# Patient Record
Sex: MALE | Race: BLACK OR AFRICAN AMERICAN | NOT HISPANIC OR LATINO | Employment: FULL TIME | URBAN - METROPOLITAN AREA
[De-identification: names, ages, dates, MRNs, and addresses within clinical notes are randomized per-mention and may not be internally consistent; named-entity substitution may affect disease eponyms.]

---

## 2023-07-13 ENCOUNTER — OFFICE VISIT (OUTPATIENT)
Dept: URGENT CARE | Facility: CLINIC | Age: 38
End: 2023-07-13
Payer: OTHER MISCELLANEOUS

## 2023-07-13 VITALS
HEIGHT: 69 IN | SYSTOLIC BLOOD PRESSURE: 126 MMHG | DIASTOLIC BLOOD PRESSURE: 81 MMHG | RESPIRATION RATE: 16 BRPM | BODY MASS INDEX: 25.77 KG/M2 | TEMPERATURE: 98 F | HEART RATE: 54 BPM | WEIGHT: 174 LBS | OXYGEN SATURATION: 97 %

## 2023-07-13 DIAGNOSIS — Z02.6 ENCOUNTER RELATED TO WORKER'S COMPENSATION CLAIM: ICD-10-CM

## 2023-07-13 DIAGNOSIS — R55 SYNCOPE AND COLLAPSE: Primary | ICD-10-CM

## 2023-07-13 PROCEDURE — 99203 PR OFFICE/OUTPT VISIT, NEW, LEVL III, 30-44 MIN: ICD-10-PCS | Mod: S$GLB,,, | Performed by: FAMILY MEDICINE

## 2023-07-13 PROCEDURE — 99203 OFFICE O/P NEW LOW 30 MIN: CPT | Mod: S$GLB,,, | Performed by: FAMILY MEDICINE

## 2023-07-13 RX ORDER — OXCARBAZEPINE 300 MG/1
300 TABLET, FILM COATED ORAL 2 TIMES DAILY
COMMUNITY
Start: 2023-06-09

## 2023-07-13 NOTE — PROGRESS NOTES
Subjective:      Patient ID: Harlan Degroot is a 38 y.o. male.    Chief Complaint: Loss of Consciousness    38 y.o. male presents after LOC/fainting. He has head pressure and pain. He states he has been having pressure by his ear on the right side of his face for the past 2 days. He was at work when he felt lightheaded and passed out. He states he was still able to hear but he can't remember how long he was down for. He states his helper had to lay him on the ground. He was hazy and his eyes were blurry. He states last week he was seeing black dots and thinks it was the heat getting the best of him. Until about 30 mins ago the pressure finally went away but he is concerned about it. He states he would have to put pressure on both ears to help with the pain.  His pulse is fluctuating at rest between 50-60 bpm but when he talks his pulse goes up. He has minimal pain in his head. He took 2 Excedrin around 8:30 am and thinks it is masking his pain. He states he does not feel like himself and does not have the same energy he normally has.    Loss of Consciousness  This is a new problem. The current episode started today. The problem occurs constantly. The problem has been unchanged. Length of episode of loss of consciousness: unsure of time frame but pt was on the floor for about 18 mins. The symptoms are aggravated by exertion. Associated symptoms include malaise/fatigue.   Cardiovascular:  Positive for passing out.   Objective:     Physical Exam  Constitutional:       General: He is not in acute distress.     Appearance: Normal appearance. He is not ill-appearing.   HENT:      Head: Normocephalic and atraumatic.   Eyes:      Extraocular Movements: Extraocular movements intact.      Pupils: Pupils are equal, round, and reactive to light.   Cardiovascular:      Rate and Rhythm: Bradycardia present.   Pulmonary:      Effort: Pulmonary effort is normal.   Abdominal:      Palpations: There is no mass.      Tenderness:  There is no abdominal tenderness.   Musculoskeletal:         General: No swelling or tenderness.      Cervical back: Normal range of motion. No rigidity.   Skin:     General: Skin is dry.   Neurological:      General: No focal deficit present.      Mental Status: He is alert. Mental status is at baseline. He is disoriented.   Psychiatric:         Behavior: Behavior normal.         Thought Content: Thought content normal.         Judgment: Judgment normal.      Assessment:      1. Heat syncope, initial encounter    2. Encounter related to worker's compensation claim      Plan:     Referrral to [facility] for return work. Pt given precautions about making sure that he is drinking enough water and making sure that he eats so that this does not happen.             No follow-ups on file.

## 2023-07-13 NOTE — LETTER
[facility]  [address]  [place] [address]  Phone: [phone]  Fax: [phone]  [facility]: [phone]    Pt Name: Harlan Degroot  Injury Date: 07/13/2023   Employee ID:  Date of First Treatment: 07/13/2023   Company: [employer] to record [employer]      Appointment Time: 10:25 AM Arrived: 10:45   Provider: Alex Obregon MD Time Out:1:01     Office Treatment:   1. Heat syncope, initial encounter    2. Encounter related to worker's compensation claim                     Return Appointment: V

## 2023-08-02 ENCOUNTER — TELEPHONE (OUTPATIENT)
Dept: URGENT CARE | Facility: CLINIC | Age: 38
End: 2023-08-02
Payer: COMMERCIAL

## 2023-08-02 NOTE — TELEPHONE ENCOUNTER
Called patient to get him scheduled with [facility]. Left message and call back number.

## 2023-09-22 ENCOUNTER — LAB VISIT (OUTPATIENT)
Dept: LAB | Facility: HOSPITAL | Age: 38
End: 2023-09-22
Attending: INTERNAL MEDICINE
Payer: COMMERCIAL

## 2023-09-22 ENCOUNTER — OFFICE VISIT (OUTPATIENT)
Dept: PRIMARY CARE CLINIC | Facility: CLINIC | Age: 38
End: 2023-09-22
Payer: COMMERCIAL

## 2023-09-22 VITALS
RESPIRATION RATE: 18 BRPM | DIASTOLIC BLOOD PRESSURE: 78 MMHG | SYSTOLIC BLOOD PRESSURE: 128 MMHG | WEIGHT: 176.56 LBS | OXYGEN SATURATION: 97 % | BODY MASS INDEX: 26.15 KG/M2 | HEART RATE: 76 BPM | HEIGHT: 69 IN | TEMPERATURE: 97 F

## 2023-09-22 DIAGNOSIS — M77.8 ELBOW TENDONITIS: ICD-10-CM

## 2023-09-22 DIAGNOSIS — Z11.59 ENCOUNTER FOR HEPATITIS C SCREENING TEST FOR LOW RISK PATIENT: ICD-10-CM

## 2023-09-22 DIAGNOSIS — Z00.00 ANNUAL PHYSICAL EXAM: ICD-10-CM

## 2023-09-22 DIAGNOSIS — Z11.4 ENCOUNTER FOR SCREENING FOR HIV: ICD-10-CM

## 2023-09-22 DIAGNOSIS — Z00.00 ANNUAL PHYSICAL EXAM: Primary | ICD-10-CM

## 2023-09-22 DIAGNOSIS — F41.9 ANXIETY: ICD-10-CM

## 2023-09-22 LAB
ALBUMIN SERPL BCP-MCNC: 4.2 G/DL (ref 3.5–5.2)
ALP SERPL-CCNC: 79 U/L (ref 55–135)
ALT SERPL W/O P-5'-P-CCNC: 22 U/L (ref 10–44)
ANION GAP SERPL CALC-SCNC: 10 MMOL/L (ref 8–16)
AST SERPL-CCNC: 21 U/L (ref 10–40)
BASOPHILS # BLD AUTO: 0.02 K/UL (ref 0–0.2)
BASOPHILS NFR BLD: 0.4 % (ref 0–1.9)
BILIRUB SERPL-MCNC: 0.3 MG/DL (ref 0.1–1)
BUN SERPL-MCNC: 11 MG/DL (ref 6–20)
CALCIUM SERPL-MCNC: 9.6 MG/DL (ref 8.7–10.5)
CHLORIDE SERPL-SCNC: 107 MMOL/L (ref 95–110)
CHOLEST SERPL-MCNC: 124 MG/DL (ref 120–199)
CHOLEST/HDLC SERPL: 3.1 {RATIO} (ref 2–5)
CO2 SERPL-SCNC: 23 MMOL/L (ref 23–29)
CREAT SERPL-MCNC: 0.9 MG/DL (ref 0.5–1.4)
DIFFERENTIAL METHOD: NORMAL
EOSINOPHIL # BLD AUTO: 0.1 K/UL (ref 0–0.5)
EOSINOPHIL NFR BLD: 2.4 % (ref 0–8)
ERYTHROCYTE [DISTWIDTH] IN BLOOD BY AUTOMATED COUNT: 11.9 % (ref 11.5–14.5)
EST. GFR  (NO RACE VARIABLE): >60 ML/MIN/1.73 M^2
GLUCOSE SERPL-MCNC: 80 MG/DL (ref 70–110)
HCT VFR BLD AUTO: 43.7 % (ref 40–54)
HCV AB SERPL QL IA: NORMAL
HDLC SERPL-MCNC: 40 MG/DL (ref 40–75)
HDLC SERPL: 32.3 % (ref 20–50)
HGB BLD-MCNC: 14.1 G/DL (ref 14–18)
HIV 1+2 AB+HIV1 P24 AG SERPL QL IA: NORMAL
IMM GRANULOCYTES # BLD AUTO: 0.01 K/UL (ref 0–0.04)
IMM GRANULOCYTES NFR BLD AUTO: 0.2 % (ref 0–0.5)
LDLC SERPL CALC-MCNC: 66.6 MG/DL (ref 63–159)
LYMPHOCYTES # BLD AUTO: 1.9 K/UL (ref 1–4.8)
LYMPHOCYTES NFR BLD: 37.6 % (ref 18–48)
MCH RBC QN AUTO: 30 PG (ref 27–31)
MCHC RBC AUTO-ENTMCNC: 32.3 G/DL (ref 32–36)
MCV RBC AUTO: 93 FL (ref 82–98)
MONOCYTES # BLD AUTO: 0.3 K/UL (ref 0.3–1)
MONOCYTES NFR BLD: 6.5 % (ref 4–15)
NEUTROPHILS # BLD AUTO: 2.7 K/UL (ref 1.8–7.7)
NEUTROPHILS NFR BLD: 52.9 % (ref 38–73)
NONHDLC SERPL-MCNC: 84 MG/DL
NRBC BLD-RTO: 0 /100 WBC
PLATELET # BLD AUTO: 275 K/UL (ref 150–450)
PMV BLD AUTO: 10.9 FL (ref 9.2–12.9)
POTASSIUM SERPL-SCNC: 4 MMOL/L (ref 3.5–5.1)
PROT SERPL-MCNC: 7.6 G/DL (ref 6–8.4)
RBC # BLD AUTO: 4.7 M/UL (ref 4.6–6.2)
SODIUM SERPL-SCNC: 140 MMOL/L (ref 136–145)
TRIGL SERPL-MCNC: 87 MG/DL (ref 30–150)
TSH SERPL DL<=0.005 MIU/L-ACNC: 0.63 UIU/ML (ref 0.4–4)
WBC # BLD AUTO: 5.08 K/UL (ref 3.9–12.7)

## 2023-09-22 PROCEDURE — 3008F PR BODY MASS INDEX (BMI) DOCUMENTED: ICD-10-PCS | Mod: CPTII,S$GLB,, | Performed by: INTERNAL MEDICINE

## 2023-09-22 PROCEDURE — 80053 COMPREHEN METABOLIC PANEL: CPT | Performed by: INTERNAL MEDICINE

## 2023-09-22 PROCEDURE — 36415 COLL VENOUS BLD VENIPUNCTURE: CPT | Performed by: INTERNAL MEDICINE

## 2023-09-22 PROCEDURE — 86803 HEPATITIS C AB TEST: CPT | Performed by: INTERNAL MEDICINE

## 2023-09-22 PROCEDURE — 87389 HIV-1 AG W/HIV-1&-2 AB AG IA: CPT | Performed by: INTERNAL MEDICINE

## 2023-09-22 PROCEDURE — 1159F PR MEDICATION LIST DOCUMENTED IN MEDICAL RECORD: ICD-10-PCS | Mod: CPTII,S$GLB,, | Performed by: INTERNAL MEDICINE

## 2023-09-22 PROCEDURE — 3078F PR MOST RECENT DIASTOLIC BLOOD PRESSURE < 80 MM HG: ICD-10-PCS | Mod: CPTII,S$GLB,, | Performed by: INTERNAL MEDICINE

## 2023-09-22 PROCEDURE — 3074F SYST BP LT 130 MM HG: CPT | Mod: CPTII,S$GLB,, | Performed by: INTERNAL MEDICINE

## 2023-09-22 PROCEDURE — 84443 ASSAY THYROID STIM HORMONE: CPT | Performed by: INTERNAL MEDICINE

## 2023-09-22 PROCEDURE — 1160F RVW MEDS BY RX/DR IN RCRD: CPT | Mod: CPTII,S$GLB,, | Performed by: INTERNAL MEDICINE

## 2023-09-22 PROCEDURE — 3008F BODY MASS INDEX DOCD: CPT | Mod: CPTII,S$GLB,, | Performed by: INTERNAL MEDICINE

## 2023-09-22 PROCEDURE — 85025 COMPLETE CBC W/AUTO DIFF WBC: CPT | Performed by: INTERNAL MEDICINE

## 2023-09-22 PROCEDURE — 99999 PR PBB SHADOW E&M-EST. PATIENT-LVL IV: ICD-10-PCS | Mod: PBBFAC,,, | Performed by: INTERNAL MEDICINE

## 2023-09-22 PROCEDURE — 99385 PR PREVENTIVE VISIT,NEW,18-39: ICD-10-PCS | Mod: S$GLB,,, | Performed by: INTERNAL MEDICINE

## 2023-09-22 PROCEDURE — 3078F DIAST BP <80 MM HG: CPT | Mod: CPTII,S$GLB,, | Performed by: INTERNAL MEDICINE

## 2023-09-22 PROCEDURE — 3074F PR MOST RECENT SYSTOLIC BLOOD PRESSURE < 130 MM HG: ICD-10-PCS | Mod: CPTII,S$GLB,, | Performed by: INTERNAL MEDICINE

## 2023-09-22 PROCEDURE — 80061 LIPID PANEL: CPT | Performed by: INTERNAL MEDICINE

## 2023-09-22 PROCEDURE — 99999 PR PBB SHADOW E&M-EST. PATIENT-LVL IV: CPT | Mod: PBBFAC,,, | Performed by: INTERNAL MEDICINE

## 2023-09-22 PROCEDURE — 99385 PREV VISIT NEW AGE 18-39: CPT | Mod: S$GLB,,, | Performed by: INTERNAL MEDICINE

## 2023-09-22 PROCEDURE — 1159F MED LIST DOCD IN RCRD: CPT | Mod: CPTII,S$GLB,, | Performed by: INTERNAL MEDICINE

## 2023-09-22 PROCEDURE — 1160F PR REVIEW ALL MEDS BY PRESCRIBER/CLIN PHARMACIST DOCUMENTED: ICD-10-PCS | Mod: CPTII,S$GLB,, | Performed by: INTERNAL MEDICINE

## 2023-09-22 RX ORDER — DIAZEPAM 2 MG/1
2 TABLET ORAL 2 TIMES DAILY PRN
COMMUNITY
Start: 2023-07-20

## 2023-09-22 NOTE — PROGRESS NOTES
[facility] Note    Chief Complaint      Chief Complaint   Patient presents with    Establish Care    Numbness     Numbness in fingers        History of Present Illness      Harlan Degroot is a 38 y.o. male who presents today for   Chief Complaint   Patient presents with    Establish Care    Numbness     Numbness in fingers    .  Patient comes to appointment here for establish preventative visit with me . He needs full screening labs with this visit as well . He is very active with his job . He is a regular weight  for [email or web address] as well .     He states incidentally that he had episode where he passed out at work . He was outide in extreme heat in warehouse . He went to urgent care felt was heat realted issues .     HPI    No problem-specific Assessment & Plan notes found for this encounter.       Problem List Items Addressed This Visit          Psychiatric    Anxiety    Overview     Is seeing dr candelario for anxiety , cont per his recs             ID    Encounter for hepatitis C screening test for low risk patient    Overview     Hep c screen          Encounter for screening for HIV    Overview     Screen             Orthopedic    Elbow tendonitis    Overview     Feeling tingling in hand and pain in eight elbow . Will refer to orhto for eval and treat             Other    Annual physical exam - Primary    Overview     pe documented needs full screening labs              Past Medical History:  History reviewed. No pertinent past medical history.    Past Surgical History:  History reviewed. No pertinent surgical history.    Family History:  family history is not on file.     Social History:  Social History     Socioeconomic History    Marital status: Single   Tobacco Use    Smoking status: Every Day     Types: Cigarettes    Smokeless tobacco: Never       Review of Systems:   Review of Systems   Constitutional:  Negative for fever and weight loss.   HENT:  Negative for congestion,  "hearing loss and sore throat.    Eyes:  Negative for blurred vision.   Respiratory:  Negative for cough and shortness of breath.    Cardiovascular:  Negative for chest pain, palpitations, claudication and leg swelling.   Gastrointestinal:  Negative for abdominal pain, constipation, diarrhea and heartburn.   Genitourinary:  Negative for dysuria.   Musculoskeletal:  Negative for back pain and myalgias.   Skin:  Negative for rash.   Neurological:  Positive for tingling. Negative for focal weakness and headaches.   Psychiatric/Behavioral:  Negative for depression and suicidal ideas. The patient is not nervous/anxious.         Medications:  Outpatient Encounter Medications as of 9/22/2023   Medication Sig Dispense Refill    diazePAM (VALIUM) 2 MG tablet Take 2 mg by mouth 2 (two) times daily as needed.      OXcarbazepine (TRILEPTAL) 300 MG Tab Take 300 mg by mouth 2 (two) times daily.       No facility-administered encounter medications on file as of 9/22/2023.       Allergies:  Review of patient's allergies indicates:  No Known Allergies      Physical Exam      Vitals:    09/22/23 0856   BP: 128/78   Pulse: 76   Resp: 18   Temp: 97 °F (36.1 °C)        Vital Signs  Temp: 97 °F (36.1 °C)  Temp Source: Oral  Pulse: 76  Resp: 18  SpO2: 97 %  BP: 128/78  BP Location: Right arm  Patient Position: Sitting  Pain Score: 0-No pain  Height and Weight  Height: 5' 9" (175.3 cm)  Weight: 80.1 kg (176 lb 9.4 oz)  BSA (Calculated - sq m): 1.97 sq meters  BMI (Calculated): 26.1  Weight in (lb) to have BMI = 25: 168.9]     Body mass index is 26.08 kg/m².    Physical Exam  Constitutional:       Appearance: He is well-developed.   HENT:      Head: Normocephalic.   Eyes:      Pupils: Pupils are equal, round, and reactive to light.   Neck:      Thyroid: No thyromegaly.   Cardiovascular:      Rate and Rhythm: Normal rate and regular rhythm.      Heart sounds: No murmur heard.     No friction rub. No gallop.   Pulmonary:      Effort: Pulmonary " "effort is normal.      Breath sounds: Normal breath sounds.   Abdominal:      General: Bowel sounds are normal.      Palpations: Abdomen is soft.   Musculoskeletal:         General: Normal range of motion.      Cervical back: Normal range of motion.   Skin:     General: Skin is warm and dry.   Neurological:      Mental Status: He is alert and oriented to person, place, and time.      Sensory: No sensory deficit.   Psychiatric:         Behavior: Behavior normal.          Laboratory:  CBC:  No results for input(s): "WBC", "RBC", "HGB", "HCT", "PLT", "MCV", "MCH", "MCHC" in the last 2160 hours.  CMP:  No results for input(s): "GLU", "CALCIUM", "ALBUMIN", "PROT", "NA", "K", "CO2", "CL", "BUN", "ALKPHOS", "ALT", "AST", "BILITOT" in the last 2160 hours.    Invalid input(s): "CREATININ"  URINALYSIS:  No results for input(s): "COLORU", "CLARITYU", "SPECGRAV", "PHUR", "PROTEINUA", "GLUCOSEU", "BILIRUBINCON", "BLOODU", "WBCU", "RBCU", "BACTERIA", "MUCUS", "NITRITE", "LEUKOCYTESUR", "UROBILINOGEN", "HYALINECASTS" in the last 2160 hours.   LIPIDS:  No results for input(s): "TSH", "HDL", "CHOL", "TRIG", "LDLCALC", "CHOLHDL", "NONHDLCHOL", "TOTALCHOLEST" in the last 2160 hours.  TSH:  No results for input(s): "TSH" in the last 2160 hours.  A1C:  No results for input(s): "HGBA1C" in the last 2160 hours.    Radiology:        Assessment:     Harlan Degroot is a 38 y.o.male with:    Annual physical exam  -     CBC Auto Differential; Future; Expected date: 09/22/2023  -     Comprehensive Metabolic Panel; Future; Expected date: 09/22/2023  -     Lipid Panel; Future; Expected date: 09/22/2023  -     TSH; Future; Expected date: 09/22/2023    Encounter for hepatitis C screening test for low risk patient  -     Hepatitis C Antibody; Future; Expected date: 09/22/2023    Encounter for screening for HIV  -     HIV 1/2 Ag/Ab (4th Gen); Future; Expected date: 09/22/2023    Anxiety    Elbow tendonitis  -     Ambulatory referral/consult to " Orthopedics; Future; Expected date: 09/29/2023                Plan:     Problem List Items Addressed This Visit          Psychiatric    Anxiety    Overview     Is seeing dr candelario for anxiety , cont per his recs             ID    Encounter for hepatitis C screening test for low risk patient    Overview     Hep c screen          Encounter for screening for HIV    Overview     Screen             Orthopedic    Elbow tendonitis    Overview     Feeling tingling in hand and pain in eight elbow . Will refer to orhto for eval and treat             Other    Annual physical exam - Primary    Overview     pe documented needs full screening labs             As above, continue current medications and maintain follow up with specialists.  Return to clinic in 6 months.      Frederick W Dantagnan [facility]

## 2023-10-02 ENCOUNTER — OFFICE VISIT (OUTPATIENT)
Dept: ORTHOPEDICS | Facility: CLINIC | Age: 38
End: 2023-10-02
Payer: COMMERCIAL

## 2023-10-02 VITALS — WEIGHT: 168.44 LBS | BODY MASS INDEX: 24.95 KG/M2 | HEIGHT: 69 IN

## 2023-10-02 DIAGNOSIS — M54.50 CHRONIC MIDLINE LOW BACK PAIN WITHOUT SCIATICA: Primary | ICD-10-CM

## 2023-10-02 DIAGNOSIS — G89.29 CHRONIC MIDLINE LOW BACK PAIN WITHOUT SCIATICA: Primary | ICD-10-CM

## 2023-10-02 DIAGNOSIS — M79.601 ARM PAIN, RIGHT: ICD-10-CM

## 2023-10-02 PROCEDURE — 3008F PR BODY MASS INDEX (BMI) DOCUMENTED: ICD-10-PCS | Mod: CPTII,S$GLB,, | Performed by: PHYSICIAN ASSISTANT

## 2023-10-02 PROCEDURE — 1159F MED LIST DOCD IN RCRD: CPT | Mod: CPTII,S$GLB,, | Performed by: PHYSICIAN ASSISTANT

## 2023-10-02 PROCEDURE — 3008F BODY MASS INDEX DOCD: CPT | Mod: CPTII,S$GLB,, | Performed by: PHYSICIAN ASSISTANT

## 2023-10-02 PROCEDURE — 99203 OFFICE O/P NEW LOW 30 MIN: CPT | Mod: S$GLB,,, | Performed by: PHYSICIAN ASSISTANT

## 2023-10-02 PROCEDURE — 99203 PR OFFICE/OUTPT VISIT, NEW, LEVL III, 30-44 MIN: ICD-10-PCS | Mod: S$GLB,,, | Performed by: PHYSICIAN ASSISTANT

## 2023-10-02 PROCEDURE — 99999 PR PBB SHADOW E&M-EST. PATIENT-LVL III: ICD-10-PCS | Mod: PBBFAC,,, | Performed by: PHYSICIAN ASSISTANT

## 2023-10-02 PROCEDURE — 1160F RVW MEDS BY RX/DR IN RCRD: CPT | Mod: CPTII,S$GLB,, | Performed by: PHYSICIAN ASSISTANT

## 2023-10-02 PROCEDURE — 99999 PR PBB SHADOW E&M-EST. PATIENT-LVL III: CPT | Mod: PBBFAC,,, | Performed by: PHYSICIAN ASSISTANT

## 2023-10-02 PROCEDURE — 1160F PR REVIEW ALL MEDS BY PRESCRIBER/CLIN PHARMACIST DOCUMENTED: ICD-10-PCS | Mod: CPTII,S$GLB,, | Performed by: PHYSICIAN ASSISTANT

## 2023-10-02 PROCEDURE — 1159F PR MEDICATION LIST DOCUMENTED IN MEDICAL RECORD: ICD-10-PCS | Mod: CPTII,S$GLB,, | Performed by: PHYSICIAN ASSISTANT

## 2023-10-02 RX ORDER — MELOXICAM 15 MG/1
15 TABLET ORAL DAILY
Qty: 30 TABLET | Refills: 1 | Status: SHIPPED | OUTPATIENT
Start: 2023-10-02

## 2023-10-03 NOTE — PROGRESS NOTES
"Chief Complaint & History of Present Illness:  Harlan Degroot is a 38 y.o. year old male presenting for evaluation of arm pain.  He had an episode of arm pain recently as well as some numbness in his fingers.   He is right hand dominant.  He paints cars for a living.  There is not a history of trauma.  He noticed his arm was tired and sore while using his pain gun.  He is no longer having pain.  He is also trying to return to exercising.  He has not noticed any decrease in  strength or dropping things.  There is not a history of previous injury or surgery to the elbow.    He also mentions some low back pain intermittently for a while.  This is worse with activity and usually resolves with rest, heat, advil.    Review of patient's allergies indicates:  No Known Allergies      Current Outpatient Medications   Medication Sig Dispense Refill    diazePAM (VALIUM) 2 MG tablet Take 2 mg by mouth 2 (two) times daily as needed.      OXcarbazepine (TRILEPTAL) 300 MG Tab Take 300 mg by mouth 2 (two) times daily.      meloxicam (MOBIC) 15 MG tablet Take 1 tablet (15 mg total) by mouth once daily. 30 tablet 1     No current facility-administered medications for this visit.       No past medical history on file.    No past surgical history on file.      Review of Systems:  ROS:  Constitutional: no fever or chills  Eyes: no visual changes  ENT: no nasal congestion or sore throat  Respiratory: no cough or shortness of breath  Cardiovascular: no chest pain or palpitations  Musculoskeletal: no arthralgias or myalgias        OBJECTIVE:     PHYSICAL EXAM:  Height: 5' 9.02" (175.3 cm) Weight: 76.4 kg (168 lb 6.9 oz), General Appearance: Well nourished, well developed, in no acute distress.  CV: 2+ UE/LE distal pulses bilaterally.  Resp:  Respirations equal and unlabored.  Neurological: Mood & affect are normal.  GI: Abdomen soft and non-tender.  Right arm  FROM elbow, wrist fingers  Skin intact  No TTP  5/5  strength, no " intrinsic weakness  ltsi C5-T1  + epl, io, fds, fdp   2+ RP       ASSESSMENT/PLAN:     38 year old male with right arm pain, low back pain without sciatica    - I suspect most of his arm pain is related to his work- likely tendonitis that has resolved.  Numbness may be related to early CTS, but does not warrant further work up at this time.  Will continue to monitor for any worsening symptoms.  - Low back pain- mentioned this as he was leaving the visit.  This has been intermittent for a while.  Will try course of meloxicam.  Advised for him to let me know if symptoms not improving.  Plan for x-rays, possible PT.

## 2023-12-25 PROBLEM — Z00.00 ANNUAL PHYSICAL EXAM: Status: RESOLVED | Noted: 2023-09-22 | Resolved: 2023-12-25
